# Patient Record
Sex: FEMALE | Race: WHITE | NOT HISPANIC OR LATINO | URBAN - METROPOLITAN AREA
[De-identification: names, ages, dates, MRNs, and addresses within clinical notes are randomized per-mention and may not be internally consistent; named-entity substitution may affect disease eponyms.]

---

## 2020-02-12 ENCOUNTER — OFFICE VISIT - RIVER FALLS (OUTPATIENT)
Dept: FAMILY MEDICINE | Facility: CLINIC | Age: 25
End: 2020-02-12

## 2020-02-17 ENCOUNTER — OFFICE VISIT - RIVER FALLS (OUTPATIENT)
Dept: FAMILY MEDICINE | Facility: CLINIC | Age: 25
End: 2020-02-17

## 2020-02-17 ASSESSMENT — MIFFLIN-ST. JEOR: SCORE: 1382.51

## 2021-03-02 ENCOUNTER — OFFICE VISIT - RIVER FALLS (OUTPATIENT)
Dept: FAMILY MEDICINE | Facility: CLINIC | Age: 26
End: 2021-03-02

## 2021-03-02 ENCOUNTER — COMMUNICATION - RIVER FALLS (OUTPATIENT)
Dept: FAMILY MEDICINE | Facility: CLINIC | Age: 26
End: 2021-03-02

## 2021-03-02 LAB
CLUE CELLS: NORMAL
TRICHOMONAS, WET PREP: NORMAL
YEAST, WET PREP: PRESENT

## 2021-03-02 ASSESSMENT — MIFFLIN-ST. JEOR: SCORE: 1482.3

## 2021-03-03 LAB — CHLAMYDIA TRACHOMATIS RNA, TMA - QUEST: NORMAL

## 2021-03-04 ENCOUNTER — AMBULATORY - RIVER FALLS (OUTPATIENT)
Dept: FAMILY MEDICINE | Facility: CLINIC | Age: 26
End: 2021-03-04

## 2021-03-05 LAB
CHLAMYDIA TRACHOMATIS RNA, TMA - QUEST: NOT DETECTED
NEISSERIA GONORRHOEAE RNA TMA: NOT DETECTED

## 2022-02-11 VITALS
SYSTOLIC BLOOD PRESSURE: 118 MMHG | HEART RATE: 84 BPM | OXYGEN SATURATION: 98 % | DIASTOLIC BLOOD PRESSURE: 64 MMHG | BODY MASS INDEX: 30.12 KG/M2 | WEIGHT: 170 LBS | HEIGHT: 63 IN

## 2022-02-11 VITALS
HEART RATE: 84 BPM | DIASTOLIC BLOOD PRESSURE: 70 MMHG | TEMPERATURE: 98.2 F | OXYGEN SATURATION: 98 % | WEIGHT: 148 LBS | WEIGHT: 145.8 LBS | HEIGHT: 63 IN | HEART RATE: 101 BPM | SYSTOLIC BLOOD PRESSURE: 102 MMHG | SYSTOLIC BLOOD PRESSURE: 112 MMHG | TEMPERATURE: 100.6 F | BODY MASS INDEX: 26.22 KG/M2 | DIASTOLIC BLOOD PRESSURE: 60 MMHG

## 2022-02-16 NOTE — NURSING NOTE
Comprehensive Intake Entered On:  3/2/2021 2:07 PM CST    Performed On:  3/2/2021 2:00 PM CST by Keli Mcclure CMA               Summary   Chief Complaint :   [facility] student: c/o itching, some vaginal odor x 3 days   Last Menstrual Period :   12/20/2020 CST   Menstrual Status :   Prophylaxis   Weight Measured :   170 lb(Converted to: 170 lb 0 oz, 77.111 kg)    Height Measured :   62.5 in(Converted to: 5 ft 2 in, 158.75 cm)    Body Mass Index :   30.59 kg/m2 (HI)    Body Surface Area :   1.84 m2   Systolic Blood Pressure :   118 mmHg   Diastolic Blood Pressure :   64 mmHg   Mean Arterial Pressure :   82 mmHg   Peripheral Pulse Rate :   84 bpm   BP Site :   Right arm   BP Method :   Manual   HR Method :   Electronic   Oxygen Saturation :   98 %   Keli Mcclure CMA - 3/2/2021 2:00 PM CST   Health Status   Allergies Verified? :   Yes   Medication History Verified? :   Yes   Immunizations Current :   Yes   Medical History Verified? :   Yes   Tobacco Use? :   Never smoker   Keli Mcclure CMA - 3/2/2021 2:00 PM CST   Consents   Consent for Immunization Exchange :   Consent Granted   Consent for Immunizations to Providers :   Consent Granted   Keli Mcclure CMA - 3/2/2021 2:00 PM CST   Meds / Allergies   (As Of: 3/2/2021 2:07:54 PM CST)   Allergies (Active)   No Known Medication Allergies  Estimated Onset Date:   Unspecified ; Created By:   Farida Sue; Reaction Status:   Active ; Category:   Drug ; Substance:   No Known Medication Allergies ; Type:   Allergy ; Updated By:   Farida Sue; Reviewed Date:   3/2/2021 2:04 PM CST        Medication List   (As Of: 3/2/2021 2:07:54 PM CST)   Home Meds    spironolactone  :   spironolactone ; Status:   Documented ; Ordered As Mnemonic:   spironolactone 50 mg oral tablet ; Simple Display Line:   50 mg, 1 tab(s), Oral, bid, 60 tab(s), 0 Refill(s) ; Catalog Code:   spironolactone ; Order Dt/Tm:   2/12/2020 11:51:24 AM CST          amphetamine  :   amphetamine ; Status:    Documented ; Ordered As Mnemonic:   amphetamine 5 mg oral tablet ; Simple Display Line:   5 mg, 1 tab(s), Oral, daily, PRN: for ADHD, 0 Refill(s) ; Catalog Code:   amphetamine ; Order Dt/Tm:   2/12/2020 11:51:57 AM CST          escitalopram  :   escitalopram ; Status:   Documented ; Ordered As Mnemonic:   Lexapro 10 mg oral tablet ; Simple Display Line:   10 mg, 1 tab(s), Oral, daily, 90 tab(s), 0 Refill(s) ; Catalog Code:   escitalopram ; Order Dt/Tm:   2/12/2020 11:51:40 AM CST          levonorgestrel  :   levonorgestrel ; Status:   Documented ; Ordered As Mnemonic:   Kyleena 19.5 mg intrauterine device ; Simple Display Line:   19.5 mg, 1 EA, Intrauteral, once, 0 Refill(s) ; Catalog Code:   levonorgestrel ; Order Dt/Tm:   3/2/2021 2:06:05 PM CST            ID Risk Screen   Recent Travel History :   No recent travel   Family Member Travel History :   No recent travel   Other Exposure to Infectious Disease :   Unknown   COVID-19 Testing Status :   No positive COVID-19 test   Keli Mcclure CMA - 3/2/2021 2:00 PM CST   Social History   Social History   (As Of: 3/2/2021 2:07:54 PM CST)   Alcohol:        Current, Wine (5 oz), Liquor (Hard) (1.5 oz), 1-2 times per month, 3 drinks/episode average.  Ready to change: No.   (Last Updated: 2/13/2020 3:01:24 PM CST by Dianne Dacosta)          Tobacco:        Never (less than 100 in lifetime)   (Last Updated: 2/13/2020 3:01:31 PM CST by Dianne Dacosta)   Never (less than 100 in lifetime)   (Last Updated: 3/2/2021 2:04:32 PM CST by Keli Mcclure CMA)          Electronic Cigarette/Vaping:        Electronic Cigarette Use: Never.   (Last Updated: 3/2/2021 2:04:36 PM CST by Keli Mcclure CMA)          Substance Abuse:        Never   (Last Updated: 2/13/2020 3:01:36 PM CST by Dianne Dacosta)          Employment/School:        Student, Highest education level: High school.   (Last Updated: 2/13/2020 3:01:44 PM CST by Dianne Dacosta)          Home/Environment:        Marital status: Single.   Living situation: Home/Independent.  Injuries/Abuse/Neglect in household: No.  Feels unsafe at home: No.  Family/Friends available for support: Yes.   (Last Updated: 2/13/2020 3:04:16 PM CST by Dianne Dacosta)          Nutrition/Health:        Type of diet: Regular.   (Last Updated: 2/13/2020 3:04:24 PM CST by Dianne Dacosta)          Sexual:        Sexually active: Yes.  Identifies as female, Sexual orientation: Straight or heterosexual.  History of STD: No.  Contraceptive Use Details: Birth control pill.  History of sexual abuse: No.   (Last Updated: 2/13/2020 3:04:42 PM CST by Dianne Dacosta)

## 2022-02-16 NOTE — TELEPHONE ENCOUNTER
---------------------  From: Kenan Melendez PA-C   To: [facility] ([id number]_[facility]);     Sent: 3/3/2021 3:05:57 PM CST  Subject: General Message     Please call and tell her that there was a collection error and we are unable to run the GC/chlamydia test. If she is concerned, we can do a urine to recheck. Explain that process to her if she wants to proceed.    TEDDY---------------------  From: Shiela Sumner CMA ([facility] Message Pool ([id number]_[facility]))   To: Kenan Melendez PA-C;     Sent: 3/3/2021 5:33:02 PM CST  Subject: RE: General Message     Pt notified and would still like testing. Has a lab appt tomorrow at 1400 for urine GC/Chlamydia test. Lab ordered.

## 2022-02-16 NOTE — PROGRESS NOTES
Chief Complaint    Here today c/o fevers (101.6) has taken ibuprofen. Has been sick since the 8th.  Coughs in spasms. Emesis friday night and Saturday 14th and 15th.  Nose is starting to run also. temporarily has been holding some regular medications.       History of Present Illness      24-year-old following up with her influenza.  She was seen last Wednesday with fever chills and muscle aches.  She did not take Tamiflu because of the duration of her symptoms at that time.       She returns today because her symptoms have continued including a high fever and she is actually coughing more than she did.  She feels a little short of breath.  She knows if she tried to do anything active her chest would feel tight and she would cough a lot  Review of Systems      See HPI.  All other review of systems negative.  Physical Exam   Vitals & Measurements    T: 98.2   F (Temporal Artery)  HR: 84(Peripheral)  BP: 102/60     HT: 62.5 in  WT: 148 lb  BMI: 26.64       Alert and oriented breathing easily normal speech       [place] harsh rhonchi in the right lower lung field left lung field is clear       Neck is supple no adenopathy       Normal oropharynx with moist membranes       No peripheral edema       No rashes  Assessment/Plan       1. Influenza (J11.1)         She has had influenza but may be developing post influenza pneumonia.  She already is using an inhaler with only slight help.  We will add prednisone along with azithromycin.  Also just for comfort at night gave her some codeine going at the same syrup.  I discussed that she should start to improve and certainly just like she did this time if she is getting any worse he needs to return quickly or if symptoms have not resolved after a week be seen rechecked       Orders:         azithromycin, See Instructions, Instructions: 2 tabs day 1 and then 1 tab days 2-5, # 6 tab(s), 0 Refill(s), Type: Acute, Pharmacy: [facility] #[id number], 2 tabs day 1 and then 1 tab  days 2-5, (Ordered)         codeine-guaifenesin, 10 mL, PO, q4hr, PRN: for cough, # 240 mL, 0 Refill(s), Type: Maintenance, Pharmacy: [facility] #[id number], 10 mL Oral q4 hrs,PRN:for cough, (Ordered)         predniSONE, = 1 tab(s) ( 20 mg ), PO, Daily, # 5 tab(s), 0 Refill(s), Type: Maintenance, Pharmacy: [facility] #[id number], 1 tab(s) Oral daily,x5 day(s), (Ordered)         91021 office outpatient visit 15 minutes (Charge), Quantity: 1, URI due to influenza  Patient Information     Name:IAIN LANDRY      Address:      [address] [place] [address] [place] [id number]     Sex:Female     YOB: 1995     [phone]     Emergency Contact:YIN LANDRY     MRN:[id number]     FIN:[id number]     Location:[facility]     Date of Service:02/17/2020      Primary Care Physician:       NONE ,       Attending Physician:       Marlon Casas MD, [phone]  Problem List/Past Medical History    Ongoing     No qualifying data    Historical     No qualifying data  Medications    albuterol 90 mcg/inh inhalation aerosol, 2 puff(s), Inhale, q4-6 hrs, PRN    amphetamine 5 mg oral tablet, 5 mg= 1 tab(s), Oral, daily, PRN    Azithromycin 5 Day Dose Pack 250 mg oral tablet, See Instructions    codeine-guaifenesin 10 mg-100 mg/5 mL oral syrup, 10 mL, Oral, q4 hrs, PRN    Lexapro 10 mg oral tablet, 10 mg= 1 tab(s), Oral, daily    Low-Ogestrel 0.3 mg-30 mcg oral tablet, 1 tab(s), Oral, daily    predniSONE 20 mg oral tablet, 20 mg= 1 tab(s), Oral, daily    spironolactone 50 mg oral tablet, 50 mg= 1 tab(s), Oral, bid    sulfacetamide sodium 10% topical lotion, 1 mamie, Topical, daily    tretinoin 0.05% topical cream, 1 mamie, Topical, daily  Allergies    No Known Medication Allergies  Social History    Smoking Status - 02/17/2020     Never smoker     Alcohol      Current, Wine (5 oz), Liquor (Hard) (1.5 oz), 1-2 times per month, 3 drinks/episode average. Ready to change: No., 02/13/2020      Employment/School      Student, Highest education level: High school., 02/13/2020     Home/Environment      Marital status: Single. Living situation: Home/Independent. Injuries/Abuse/Neglect in household: No. Feels unsafe at home: No. Family/Friends available for support: Yes., 02/13/2020     Nutrition/Health      Type of diet: Regular., 02/13/2020     Sexual      Sexually active: Yes. Identifies as female, Sexual orientation: Straight or heterosexual. History of STD: No. Contraceptive Use Details: Birth control pill. History of sexual abuse: No., 02/13/2020     Substance Abuse      Never, 02/13/2020     Tobacco      Never (less than 100 in lifetime), 02/13/2020  Family History    Depression: Grandparent.  Immunizations      Vaccine Date Status          influenza virus vaccine, inactivated 12/27/2019 Recorded          influenza virus vaccine, inactivated 09/21/2017 Recorded          tetanus/diphth/pertuss (Tdap) adult/adol 06/10/2017 Recorded          meningococcal conjugate vaccine 03/04/2016 Recorded          Hep A, pediatric/adolescent 03/04/2016 Recorded          Hep A, pediatric/adolescent 11/18/2013 Recorded          influenza virus vaccine, inactivated 11/18/2013 Recorded          influenza virus vaccine, inactivated 12/05/2012 Recorded          human papillomavirus vaccine 08/11/2012 Recorded          human papillomavirus vaccine 05/21/2012 Recorded          human papillomavirus vaccine 01/05/2012 Recorded          influenza virus vaccine, inactivated 10/27/2011 Recorded          influenza virus vaccine, inactivated 03/11/2010 Recorded          varicella 06/08/2009 Recorded          tetanus/diphth/pertuss (Tdap) adult/adol 06/08/2009 Recorded          influenza virus vaccine, inactivated 12/11/2003 Recorded          DTaP 08/02/2002 Recorded          MMR (measles/mumps/rubella) 08/02/2002 Recorded          IPV 08/02/2002 Recorded          DTaP 10/08/1998 Recorded          Hep B-Hib 07/06/1998 Recorded           MMR (measles/mumps/rubella) 07/06/1998 Recorded          IPV 07/06/1998 Recorded          DTaP 01/26/1998 Recorded          DTaP 1997 Recorded          Hep B-Hib 1997 Recorded          IPV 1997 Recorded          DTaP 1997 Recorded          Hep B-Hib 1997 Recorded          IPV 1997 Recorded

## 2022-02-16 NOTE — NURSING NOTE
Comprehensive Intake Entered On:  2/17/2020 4:03 PM CST    Performed On:  2/17/2020 3:55 PM CST by Lori Austin RN               Summary   Chief Complaint :   Here today c/o fevers (101.6) has taken ibuprofen. Has been sick since the 8th.  Coughs in spasms. Emesis friday night and Saturday 14th and 15th.  Nose is starting to run also. temporarily has been holding some regular medications.         Last Menstrual Period :   1/22/2020 CST   Menstrual Status :   Menarcheal   Weight Measured :   148 lb(Converted to: 148 lb 0 oz, 67.13 kg)    Height Measured :   62.5 in(Converted to: 5 ft 2 in, 158.75 cm)    Body Mass Index :   26.64 kg/m2 (HI)    Body Surface Area :   1.72 m2   Systolic Blood Pressure :   102 mmHg   Diastolic Blood Pressure :   60 mmHg   Mean Arterial Pressure :   74 mmHg   Peripheral Pulse Rate :   84 bpm   Temperature Temporal :   98.2 DegF(Converted to: 36.8 DegC)    Lori Austin RN - 2/17/2020 3:55 PM CST   Health Status   Allergies Verified? :   Yes   Medication History Verified? :   Yes   Immunizations Current :   Yes   Medical History Verified? :   No   Pre-Visit Planning Status :   N/A   Consents Current :   Yes   Planning a Pregnancy? :   No   Tobacco Use? :   Never smoker   Lori Austin RN - 2/17/2020 3:55 PM CST   Consents   Consent for Immunization Exchange :   Consent Granted   Consent for Immunizations to Providers :   Consent Granted   Lori Austin RN - 2/17/2020 3:55 PM CST

## 2022-02-16 NOTE — PROGRESS NOTES
Patient:   IAIN LANDRY            MRN: [id number]            FIN: [id number]               Age:   24 years     Sex:  Female     :  1995   Associated Diagnoses:   URI due to influenza   Author:   Marlon Casas MD      Visit Information      Date of Service: 2020 11:48 am  Performing Location: [facility]  Encounter#: [id number]      Primary Care Provider (PCP):  NONE ,       Referring Provider:  Marlon Casas MD    NPI# [id number]      Chief Complaint   2020 11:50 AM CST   Cough x3-4 days.  Weakness, chills.        Subjective   Chief complaint 2020 11:50 AM CST   Cough x3-4 days.  Weakness, chills.  .  Additional information see chief complaint as noted above and confirmed with the patient  has been achy and generally tired  no SOB at rest but some occurs when more active  able to eat and drink.        Health Status   Allergies:    Allergic Reactions (Selected)  No Known Medication Allergies   Medications:  (Selected)   Prescriptions  Prescribed  albuterol 90 mcg/inh inhalation aerosol: 2 puff(s), Inhale, q4-6 hrs, PRN: for cough, # 1 EA, 0 Refill(s), Type: Maintenance, Pharmacy: [facility] #[id number], 2 puff(s) Inhale q4-6 hrs,PRN:for cough  Documented Medications  Documented  Lexapro 10 mg oral tablet: = 1 tab(s) ( 10 mg ), Oral, daily, # 90 tab(s), 0 Refill(s), Type: Maintenance  Low-Ogestrel 0.3 mg-30 mcg oral tablet: 1 tab(s), Oral, daily, # 84 tab(s), 0 Refill(s), Type: Maintenance  amphetamine 5 mg oral tablet: = 1 tab(s) ( 5 mg ), Oral, daily, PRN: for ADHD, 0 Refill(s), Type: Maintenance  spironolactone 50 mg oral tablet: = 1 tab(s) ( 50 mg ), Oral, bid, # 60 tab(s), 0 Refill(s), Type: Maintenance  sulfacetamide sodium 10% topical lotion: 1 mamie, Topical, daily, 0 Refill(s), Type: Maintenance  tretinoin 0.05% topical cream: 1 mamie, Topical, daily, 0 Refill(s), Type: Maintenance,    Medications          *denotes recorded medication          albuterol 90  mcg/inh inhalation aerosol: 2 puff(s), Inhale, q4-6 hrs, PRN: for cough, 1 EA, 0 Refill(s).          *amphetamine 5 mg oral tablet: 5 mg, 1 tab(s), Oral, daily, PRN: for ADHD, 0 Refill(s).          *Lexapro 10 mg oral tablet: 10 mg, 1 tab(s), Oral, daily, 90 tab(s), 0 Refill(s).          *Low-Ogestrel 0.3 mg-30 mcg oral tablet: 1 tab(s), Oral, daily, 84 tab(s), 0 Refill(s).          *spironolactone 50 mg oral tablet: 50 mg, 1 tab(s), Oral, bid, 60 tab(s), 0 Refill(s).          *sulfacetamide sodium 10% topical lotion: 1 mamie, Topical, daily, 0 Refill(s).          *tretinoin 0.05% topical cream: 1 mamie, Topical, daily, 0 Refill(s).          Objective   Vital Signs   2/12/2020 11:50 AM CST Temperature Tympanic 100.6 DegF    Peripheral Pulse Rate 101 bpm  HI    Systolic Blood Pressure 112 mmHg    Diastolic Blood Pressure 70 mmHg    Mean Arterial Pressure 84 mmHg    Oxygen Saturation 98 %      Measurements from flowsheet : Measurements   2/12/2020 11:50 AM CST   Weight Measured           145.8 lb     General:  Alert and oriented, No acute distress.    HENT:  Normocephalic.    Neck:  Supple.    Respiratory:  Lungs are clear to auscultation, Respirations are non-labored.    Cardiovascular:  Normal rate, Regular rhythm.    Integumentary:  Warm.    Psychiatric:  Cooperative, Appropriate mood & affect.       Impression and Plan   Assessment and Plan:          Diagnosis: URI due to influenza (AXZ65-AW J10.1).    Orders      (Selected)   Prescriptions  Prescribed  albuterol 90 mcg/inh inhalation aerosol: 2 puff(s), Inhale, q4-6 hrs, PRN: for cough, # 1 EA, 0 Refill(s), Type: Maintenance, Pharmacy: [facility] #[id number], 2 puff(s) Inhale q4-6 hrs,PRN:for cough.     Reviewed expected course, what to watch for and when to return..     .

## 2022-02-16 NOTE — PROGRESS NOTES
Patient:   IAIN LANDRY            MRN: [id number]            FIN: [id number]               Age:   25 years     Sex:  Female     :  1995   Associated Diagnoses:   Vaginal itching; Yeast vaginitis   Author:   Kenan Melendez PA-C      Visit Information   Visit type:  General concerns.    Accompanied by:  No one.    Source of history:  Self.    Referral source:  Self.    History limitation:  None.       Chief Complaint   3/2/2021 2:00 PM CST     [employer] [facility] student: c/o itching, some vaginal odor x 3 days      History of Present Illness             The patient presents with vaginitis.  The vaginitis is characterized by itching and foul vaginal odor.  The severity of the vaginitis symptom(s) is mild.  The symptom(s) of vaginitis is constant.  The vaginitis symptom(s) has lasted for 3 day(s).  Had IUD placed in Nov. Menses in Dec. Spotting in . no bleeding since. No new partners. They have no symptoms. No dysuria. Vaginal itching and odor. Scant to no discharge. No history of STI. No fever or chills. No recent antibiotics. CC above noted and confirmed with the patient..        Review of Systems   Constitutional:  Negative.    Genitourinary:  Negative except as documented in history of present illness.    Gynecologic:  Negative except as documented in history of present illness.       Health Status   Allergies:    Allergic Reactions (Selected)  No Known Medication Allergies   Medications:  (Selected)   Documented Medications  Documented  Kyleena 19.5 mg intrauterine device: = 1 EA ( 19.5 mg ), Intrauteral, once, 0 Refill(s), Type: Maintenance  Lexapro 10 mg oral tablet: = 1 tab(s) ( 10 mg ), Oral, daily, # 90 tab(s), 0 Refill(s), Type: Maintenance  amphetamine 5 mg oral tablet: = 1 tab(s) ( 5 mg ), Oral, daily, PRN: for ADHD, 0 Refill(s), Type: Maintenance  spironolactone 50 mg oral tablet: = 1 tab(s) ( 50 mg ), Oral, bid, # 60 tab(s), 0 Refill(s), Type: Maintenance   Problem list:    All Problems  Obesity / SNOMED CT  [id number] / Probable      Histories   Past Medical History:    No active or resolved past medical history items have been selected or recorded.   Family History:    Depression  Grandparent     Procedure history:    No active procedure history items have been selected or recorded.   Social History:        Electronic Cigarette/Vaping Assessment            Electronic Cigarette Use: Never.      Alcohol Assessment            Current, Wine (5 oz), Liquor (Hard) (1.5 oz), 1-2 times per month, 3 drinks/episode average.  Ready to               change: No.      Tobacco Assessment            Never (less than 100 in lifetime)            Never (less than 100 in lifetime)      Substance Abuse Assessment            Never      Employment and Education Assessment            Student, Highest education level: High school.      Home and Environment Assessment            Marital status: Single.  Living situation: Home/Independent.  Injuries/Abuse/Neglect in household: No.  Feels               unsafe at home: No.  Family/Friends available for support: Yes.      Nutrition and Health Assessment            Type of diet: Regular.      Sexual Assessment            Sexually active: Yes.  Identifies as female, Sexual orientation: Straight or heterosexual.  History of STD:               No.  Contraceptive Use Details: Birth control pill.  History of sexual abuse: No.        Physical Examination   Vital Signs   3/2/2021 2:00 PM CST Peripheral Pulse Rate 84 bpm    HR Method Electronic    Systolic Blood Pressure 118 mmHg    Diastolic Blood Pressure 64 mmHg    Mean Arterial Pressure 82 mmHg    BP Site Right arm    BP Method Manual    Oxygen Saturation 98 %      Genitourinary:  No costovertebral angle tenderness, No lesions, Exam with Keli Sadek present.         Vulva: Erythema.         Labia: Bilateral, Within normal limits.         Vagina: Discharge ( White ).       Review / Management   Results review:  Lab results   3/2/2021 2:34 PM CST Wet  Prep Yeast Present    Wet Prep Trichomonas None Seen    Wet Prep Clue Cells None Seen   .    Course:  Worsening.       Impression and Plan   Diagnosis     Vaginal itching (LEB95-HG N89.8).     Yeast vaginitis (UUK18-OO B37.3).     Patient Instructions:       Counseled: Patient, Regarding diagnosis, Regarding treatment, Regarding medications, Activity, Verbalized understanding.    Summary:  FU if symptoms not resolved in one week or prior if concerns.    Orders     Orders (Selected)   Outpatient Orders  Ordered (In Transit)  Chlamydia/Neisseria gonorrhoeae RNA, TMA* ([facility]): Specimen Type: Swab, Collection Date: 03/02/21 14:14:00 CST  Prescriptions  Prescribed  fluconazole 150 mg oral tablet: = 1 tab(s) ( 150 mg ), Oral, once, Instructions: Rpt in 3-5 days PRN., # 2 tab(s), 0 Refill(s), Type: Soft Stop, Pharmacy: [facility] #[id number], 1 tab(s) Oral once,Instr:Rpt in 3-5 days PRN., 62.5, in, 03/02/21 14:00:00 CST, Height Measured, 17....